# Patient Record
(demographics unavailable — no encounter records)

---

## 2024-11-22 NOTE — HISTORY OF PRESENT ILLNESS
[Mammogramdate] : EARLY 2024 [PapSmeardate] : N/A [BoneDensityDate] : 11/24 [ColonoscopyDate] : 2024